# Patient Record
Sex: FEMALE
[De-identification: names, ages, dates, MRNs, and addresses within clinical notes are randomized per-mention and may not be internally consistent; named-entity substitution may affect disease eponyms.]

---

## 2017-12-17 ENCOUNTER — CHARTING TRANS (OUTPATIENT)
Dept: OTHER | Age: 22
End: 2017-12-17

## 2017-12-17 ENCOUNTER — LAB SERVICES (OUTPATIENT)
Dept: OTHER | Age: 22
End: 2017-12-17

## 2017-12-17 LAB
FLU A AG RAPID SCREEN: POSITIVE
FLU B AG RAPID SCREEN: NEGATIVE
FLU RAPID SCREEN: POSITIVE
SOURCE: NORMAL

## 2018-11-02 VITALS
WEIGHT: 164.24 LBS | BODY MASS INDEX: 30.22 KG/M2 | OXYGEN SATURATION: 96 % | TEMPERATURE: 99.4 F | SYSTOLIC BLOOD PRESSURE: 131 MMHG | RESPIRATION RATE: 18 BRPM | HEIGHT: 62 IN | HEART RATE: 129 BPM | DIASTOLIC BLOOD PRESSURE: 69 MMHG

## 2024-02-12 ENCOUNTER — OFFICE VISIT (OUTPATIENT)
Dept: INTERNAL MEDICINE CLINIC | Facility: CLINIC | Age: 29
End: 2024-02-12
Payer: COMMERCIAL

## 2024-02-12 VITALS
SYSTOLIC BLOOD PRESSURE: 104 MMHG | TEMPERATURE: 99 F | HEIGHT: 61 IN | HEART RATE: 73 BPM | OXYGEN SATURATION: 99 % | DIASTOLIC BLOOD PRESSURE: 62 MMHG | BODY MASS INDEX: 23.34 KG/M2 | WEIGHT: 123.63 LBS | RESPIRATION RATE: 14 BRPM

## 2024-02-12 DIAGNOSIS — Z12.4 SCREENING FOR CERVICAL CANCER: ICD-10-CM

## 2024-02-12 DIAGNOSIS — Z00.00 ROUTINE PHYSICAL EXAMINATION: Primary | ICD-10-CM

## 2024-02-12 DIAGNOSIS — Z11.3 SCREENING EXAMINATION FOR STD (SEXUALLY TRANSMITTED DISEASE): ICD-10-CM

## 2024-02-12 DIAGNOSIS — F41.9 ANXIETY: ICD-10-CM

## 2024-02-12 DIAGNOSIS — G43.E09 CHRONIC MIGRAINE WITH AURA WITHOUT STATUS MIGRAINOSUS, NOT INTRACTABLE: ICD-10-CM

## 2024-02-12 PROBLEM — L03.031 PARONYCHIA OF GREAT TOE, RIGHT: Status: ACTIVE | Noted: 2024-02-12

## 2024-02-12 PROBLEM — M79.674 PAIN OF RIGHT GREAT TOE: Status: RESOLVED | Noted: 2024-02-12 | Resolved: 2024-02-12

## 2024-02-12 PROBLEM — M79.674 PAIN OF RIGHT GREAT TOE: Status: ACTIVE | Noted: 2024-02-12

## 2024-02-12 PROBLEM — L03.031 PARONYCHIA OF GREAT TOE, RIGHT: Status: RESOLVED | Noted: 2024-02-12 | Resolved: 2024-02-12

## 2024-02-12 PROCEDURE — 87491 CHLMYD TRACH DNA AMP PROBE: CPT | Performed by: INTERNAL MEDICINE

## 2024-02-12 PROCEDURE — 87591 N.GONORRHOEAE DNA AMP PROB: CPT | Performed by: INTERNAL MEDICINE

## 2024-02-12 RX ORDER — ESCITALOPRAM OXALATE 10 MG/1
TABLET ORAL
Qty: 90 TABLET | Refills: 0 | Status: SHIPPED | OUTPATIENT
Start: 2024-02-12

## 2024-02-12 NOTE — PROGRESS NOTES
Patient Office Visit    ASSESSMENT AND PLAN:   1. Routine physical examination  Note: Continue to exercise at least 150 minutes a week and Eat a plant based diet. Please take 2000 IU of vitamin D daily.  Recommended to get the Tdap vaccine through the pharmacy  - ALT(SGPT); Future  - AST (SGOT); Future  - Basic Metabolic Panel (8); Future  - Lipid Panel; Future  - CBC With Differential With Platelet; Future  - TSH W Reflex To Free T4; Future    2. Chronic migraine with aura without status migrainosus, not intractable  Note: Recommended to try magnesium and a vitamin B complex to see if that helps.  If it does not then would highly recommend to try sumatriptan    3. Anxiety  Note: Will start with Lexapro as below.  Discussed the side effects and will refer to therapist.  Discussed with patient that it would take 4 to 6 weeks to see an effect  - escitalopram 10 MG Oral Tab; Take half a tablet for one week then increase to one full tablet  Dispense: 90 tablet; Refill: 0  - TSH W Reflex To Free T4; Future  - OP REFERRAL TO [facility]    4. Screening for cervical cancer  - ThinPrep PAP with HPV Reflex Request B; Future  - ThinPrep PAP with HPV Reflex Request B    5. Screening examination for STD (sexually transmitted disease)  - Chlamydia/Gc Amplification [E]; Future  - HIV AG AB Combo [E]; Future  - T Pallidum Screening Cascade [E]; Future  - Hepatitis B Surface Antigen [E]; Future  - HCV Antibody [E]; Future  - Chlamydia/Gc Amplification [E]    Return to clinic in 4 to 6 weeks for anxiety check      Patient/Caregiver Education: Patient/Caregiver Education: There are no barriers to learning. Medical education done. Outcome: Patient verbalizes understanding. Patient is notified to call with any questions, complications, allergies, or worsening or changing symptoms.  Patient is to call with any side effects or complications from the treatments as a result of today.      Reviewed Past Medical History and   Patient Active  Problem List   Diagnosis    Chronic migraine with aura without status migrainosus, not intractable    Anxiety       Orders Placed This Encounter   Procedures    ALT(SGPT)     Standing Status:   Future     Standing Expiration Date:   2/12/2025    AST (SGOT)     Standing Status:   Future     Standing Expiration Date:   2/12/2025    Basic Metabolic Panel (8)     Standing Status:   Future     Standing Expiration Date:   2/12/2025    Lipid Panel     Standing Status:   Future     Standing Expiration Date:   2/12/2025    CBC With Differential With Platelet     Standing Status:   Future     Standing Expiration Date:   2/12/2025    TSH W Reflex To Free T4     Standing Status:   Future     Standing Expiration Date:   2/12/2025    HIV AG AB Combo [E]     Standing Status:   Future     Standing Expiration Date:   2/12/2025     Order Specific Question:   Consent obtained?     Answer:   Yes     Order Specific Question:   Release to patient     Answer:   Immediate    T Pallidum Screening Brant [E]     Standing Status:   Future     Standing Expiration Date:   2/12/2025     Order Specific Question:   Release to patient     Answer:   Immediate    Hepatitis B Surface Antigen [E]     Standing Status:   Future     Standing Expiration Date:   2/12/2025     Order Specific Question:   Release to patient     Answer:   Immediate    HCV Antibody [E]     Standing Status:   Future     Standing Expiration Date:   2/12/2025     Order Specific Question:   Release to patient     Answer:   Immediate     Requested Prescriptions     Signed Prescriptions Disp Refills    escitalopram 10 MG Oral Tab 90 tablet 0     Sig: Take half a tablet for one week then increase to one full tablet         Susana Morris DO  CC:  Chief Complaint   Patient presents with    Physical         HPI:   Maye Vega is a 28 year old female who presents for a physical    Migraines: Has had most of her life.  She tends to get it for at least 2 weeks out of a month.  She  has to take Excedrin on a weekly basis but does not want to take her medication regularly.  She does get an aura and has to sleep to make it feel better.  She is not sure if anxiety is triggering.  She is wondering if she can take any natural treatments to help with the symptoms.  She has noticed that when she is stressed and has not slept well then the symptoms get worse.  Denies any focal deficits  Anxiety: She gets anxious about very simple things easily.  She is interested in trying a medication and have been considering therapy.  She really feels that the anxiety may be triggering her migraines    History reviewed. No pertinent past medical history.    History reviewed. No pertinent surgical history.    Social History:  Social History     Socioeconomic History    Marital status:    Tobacco Use    Smoking status: Never    Smokeless tobacco: Never   Vaping Use    Vaping Use: Never used   Substance and Sexual Activity    Alcohol use: Yes    Drug use: Never     Family History:  Family History   Adopted: Yes     Allergies:  Allergies   Allergen Reactions    Other UNKNOWN     Seasonal allergies    Sulfa Antibiotics OTHER (SEE COMMENTS)     Seasonal allergies     Current Meds:  No current outpatient medications on file prior to visit.     No current facility-administered medications on file prior to visit.         REVIEW OF SYSTEMS   Constitutional: no fatigue normal energy no weight changes   HENT: normal sinuses and no mouth issues   Eyes: . normal vision no eye pain   Respiratory: normal respirations no cough   Cardiovascular: no CP, or palpitations   Gastrointestinal: normal bowels and no abd pains   Genitourinary:  normal urination no hematuria, no frequency   Musculoskeletal: no pains in arms/legs, normal range of motion   Skin: no rashes or skin lesions that are new   Neurological: As above  Hematological:  no bruises or bleeding   Psychiatric/Behavioral: Anxiety    /62 (BP Location: Right arm,  Patient Position: Sitting, Cuff Size: adult)   Pulse 73   Temp 98.7 °F (37.1 °C) (Temporal)   Resp 14   Ht 5' 1\" (1.549 m)   Wt 123 lb 9.6 oz (56.1 kg)   LMP 01/25/2024 (Approximate)   SpO2 99%   BMI 23.35 kg/m²     MA: Lola present in the room   PHYSICAL EXAM:   Constitutional: Vital signs reviewed as noted, well developed, in no acute distress.   HENT: NCAT, bilateral ear canal and tympanic membrane appear normal  Eyes: pupils reactive bilaterally  Neck: No thyroidmegaly  Cardiovascular: nl s1 s2 no m/r/g  Pulmonary/Chest: CTA bilaterally with no wheezes  Abdominal: Soft NT normal Bowel sounds  : normal external genitalia without skin lesions or rashes, normal cervix, no lesions, no abnormal discharge  Extremities: no pedal edema   Neurological:  no weakness in UE and LE, reflexes are normal  Skin: no rashes or bruises on visualized skin, not undressed   Psychiatric:normal mood

## 2024-02-13 LAB
C TRACH DNA SPEC QL NAA+PROBE: NEGATIVE
N GONORRHOEA DNA SPEC QL NAA+PROBE: NEGATIVE

## 2024-02-26 DIAGNOSIS — B96.89 BV (BACTERIAL VAGINOSIS): Primary | ICD-10-CM

## 2024-02-26 DIAGNOSIS — N76.0 BV (BACTERIAL VAGINOSIS): Primary | ICD-10-CM

## 2024-02-26 RX ORDER — METRONIDAZOLE 7.5 MG/G
GEL VAGINAL
Qty: 70 G | Refills: 0 | Status: SHIPPED | OUTPATIENT
Start: 2024-02-26

## 2024-03-18 ENCOUNTER — TELEPHONE (OUTPATIENT)
Dept: INTERNAL MEDICINE CLINIC | Facility: CLINIC | Age: 29
End: 2024-03-18

## 2024-03-18 NOTE — TELEPHONE ENCOUNTER
[facility]    3/18/24 rx should be #[id number]   Sig 1/5 tabs daily    Verbal order given to Mo[facility] to correct sig

## 2024-06-17 DIAGNOSIS — F41.9 ANXIETY: ICD-10-CM

## 2024-06-17 NOTE — TELEPHONE ENCOUNTER
Pt wants prescription fill every 3 months         Protocol PASS    Requesting:     LOV: 2/12/24  RTC: 4-6 weeks  Filled: 3/18/24 135 tablet 1 refill  Recent Labs: none    Upcoming OV none  Future Appointments   Date Time Provider Department Center   6/19/2024 11:00 AM Cinda Fernandez LCSW, [facility]   6/25/2024 11:00 AM Cinda Fernandez LCSW, [facility]   7/11/2024 11:00 AM Cinda Fernandez LCSW, [facility]

## 2024-06-18 RX ORDER — ESCITALOPRAM OXALATE 10 MG/1
15 TABLET ORAL DAILY
Qty: 135 TABLET | Refills: 1 | Status: SHIPPED | OUTPATIENT
Start: 2024-06-18

## 2024-12-21 DIAGNOSIS — F41.9 ANXIETY: ICD-10-CM

## 2024-12-23 DIAGNOSIS — F41.9 ANXIETY: ICD-10-CM

## 2024-12-23 RX ORDER — SERTRALINE HYDROCHLORIDE 25 MG/1
TABLET, FILM COATED ORAL
Qty: 90 TABLET | Refills: 0 | OUTPATIENT
Start: 2024-12-23

## 2024-12-23 RX ORDER — SERTRALINE HYDROCHLORIDE 25 MG/1
TABLET, FILM COATED ORAL
Qty: 180 TABLET | Refills: 0 | Status: SHIPPED | OUTPATIENT
Start: 2024-12-23

## 2024-12-23 NOTE — TELEPHONE ENCOUNTER
Name from pharmacy: SERTRALINE 25MG TABLETS         Will file in chart as: SERTRALINE 25 MG Oral Tab     Possible duplicate: Hoalee to review recent actions on this medication    Sig: TAKE 1 TABLET BY MOUTH EVERY NIGHT. IF NO IMPROVEMENT IN 1 WEEK THEN YOU MAY INCREASE TO 2 TABLETS NIGHTLY    Disp: 90 tablet    Refills: 0 (Pharmacy requested: Not specified)    Start: 12/21/2024    Class: Normal    Non-formulary For: Anxiety    Last ordered: 1 month ago (10/29/2024) by Susana Morris DO    Last refill: 10/29/2024    Rx #: [id number]    Psychiatric Non-Scheduled (Anti-Anxiety) [id number]/21/2024 12:07 PM   Protocol Details In person appointment or virtual visit in the past 6 mos or appointment in next 3 mos    Depression Screening completed within the past 12 months      To be filled at: [facility] #[id number] [address]  [address] [facility] [address] , [phone], [phone]     LOV: 10/29/2024  RTC:4-6 weeks   Labs:n/a   Last filled:10/29/2024  No future appointments.\

## 2024-12-23 NOTE — TELEPHONE ENCOUNTER
sertraline 25 MG Oral Tab             Sig: Take one tablet nightly, if no improvement in 1 week then you may increase to 2 tablets    Disp: 90 tablet    Refills: 0    Start: 12/23/2024    Class: Normal    For: Anxiety    Last ordered: 1 month ago (10/29/2024) by Susana Morris DO    Patient comment: I have taken (2) at night and I am very happy with the results.    Psychiatric Non-Scheduled (Anti-Anxiety) [id number]/23/2024 10:10 AM   Protocol Details In person appointment or virtual visit in the past 6 mos or appointment in next 3 mos    Depression Screening completed within the past 12 months      To be filled at: [facility] #[id number] [facility] [address] AT [place] [address], [phone], [phone]          LOV:  2/12/24  RTC:   4 to 6 weeks  Recent Labs:  none    Upcoming OV  none